# Patient Record
Sex: FEMALE | Race: WHITE | NOT HISPANIC OR LATINO | URBAN - METROPOLITAN AREA
[De-identification: names, ages, dates, MRNs, and addresses within clinical notes are randomized per-mention and may not be internally consistent; named-entity substitution may affect disease eponyms.]

---

## 2023-02-21 LAB
ANION GAP IN SER/PLAS: 11 MMOL/L (ref 10–20)
CALCIUM (MG/DL) IN SER/PLAS: 9.8 MG/DL (ref 8.6–10.6)
CARBON DIOXIDE, TOTAL (MMOL/L) IN SER/PLAS: 30 MMOL/L (ref 21–32)
CHLORIDE (MMOL/L) IN SER/PLAS: 104 MMOL/L (ref 98–107)
CREATININE (MG/DL) IN SER/PLAS: 0.84 MG/DL (ref 0.5–1.05)
GFR FEMALE: 78 ML/MIN/1.73M2
GLUCOSE (MG/DL) IN SER/PLAS: 79 MG/DL (ref 74–99)
POTASSIUM (MMOL/L) IN SER/PLAS: 4.3 MMOL/L (ref 3.5–5.3)
SODIUM (MMOL/L) IN SER/PLAS: 141 MMOL/L (ref 136–145)
UREA NITROGEN (MG/DL) IN SER/PLAS: 12 MG/DL (ref 6–23)

## 2023-07-31 LAB
ALANINE AMINOTRANSFERASE (SGPT) (U/L) IN SER/PLAS: 16 U/L (ref 7–45)
ALBUMIN (G/DL) IN SER/PLAS: 4.7 G/DL (ref 3.4–5)
ALKALINE PHOSPHATASE (U/L) IN SER/PLAS: 62 U/L (ref 33–136)
ANION GAP IN SER/PLAS: 12 MMOL/L (ref 10–20)
ASPARTATE AMINOTRANSFERASE (SGOT) (U/L) IN SER/PLAS: 20 U/L (ref 9–39)
BILIRUBIN TOTAL (MG/DL) IN SER/PLAS: 0.6 MG/DL (ref 0–1.2)
CALCIUM (MG/DL) IN SER/PLAS: 10.5 MG/DL (ref 8.6–10.6)
CARBON DIOXIDE, TOTAL (MMOL/L) IN SER/PLAS: 31 MMOL/L (ref 21–32)
CHLORIDE (MMOL/L) IN SER/PLAS: 100 MMOL/L (ref 98–107)
CHOLESTEROL (MG/DL) IN SER/PLAS: 161 MG/DL (ref 0–199)
CHOLESTEROL IN HDL (MG/DL) IN SER/PLAS: 87.5 MG/DL
CHOLESTEROL/HDL RATIO: 1.8
CREATININE (MG/DL) IN SER/PLAS: 0.89 MG/DL (ref 0.5–1.05)
ERYTHROCYTE DISTRIBUTION WIDTH (RATIO) BY AUTOMATED COUNT: 14.1 % (ref 11.5–14.5)
ERYTHROCYTE MEAN CORPUSCULAR HEMOGLOBIN CONCENTRATION (G/DL) BY AUTOMATED: 32 G/DL (ref 32–36)
ERYTHROCYTE MEAN CORPUSCULAR VOLUME (FL) BY AUTOMATED COUNT: 95 FL (ref 80–100)
ERYTHROCYTES (10*6/UL) IN BLOOD BY AUTOMATED COUNT: 4.62 X10E12/L (ref 4–5.2)
GFR FEMALE: 73 ML/MIN/1.73M2
GLUCOSE (MG/DL) IN SER/PLAS: 84 MG/DL (ref 74–99)
HEMATOCRIT (%) IN BLOOD BY AUTOMATED COUNT: 43.7 % (ref 36–46)
HEMOGLOBIN (G/DL) IN BLOOD: 14 G/DL (ref 12–16)
LDL: 64 MG/DL (ref 0–99)
LEUKOCYTES (10*3/UL) IN BLOOD BY AUTOMATED COUNT: 4.8 X10E9/L (ref 4.4–11.3)
NRBC (PER 100 WBCS) BY AUTOMATED COUNT: 0 /100 WBC (ref 0–0)
PLATELETS (10*3/UL) IN BLOOD AUTOMATED COUNT: 362 X10E9/L (ref 150–450)
POTASSIUM (MMOL/L) IN SER/PLAS: 4.3 MMOL/L (ref 3.5–5.3)
PROTEIN TOTAL: 7 G/DL (ref 6.4–8.2)
SODIUM (MMOL/L) IN SER/PLAS: 139 MMOL/L (ref 136–145)
TRIGLYCERIDE (MG/DL) IN SER/PLAS: 50 MG/DL (ref 0–149)
UREA NITROGEN (MG/DL) IN SER/PLAS: 13 MG/DL (ref 6–23)
VLDL: 10 MG/DL (ref 0–40)

## 2023-10-02 DIAGNOSIS — I34.0 MITRAL VALVE INSUFFICIENCY, UNSPECIFIED ETIOLOGY: Primary | ICD-10-CM

## 2023-10-10 ENCOUNTER — HOSPITAL ENCOUNTER (OUTPATIENT)
Dept: CARDIOLOGY | Facility: CLINIC | Age: 63
Discharge: HOME | End: 2023-10-10
Payer: COMMERCIAL

## 2023-10-10 VITALS
HEIGHT: 68 IN | BODY MASS INDEX: 25.46 KG/M2 | DIASTOLIC BLOOD PRESSURE: 82 MMHG | SYSTOLIC BLOOD PRESSURE: 130 MMHG | WEIGHT: 168 LBS

## 2023-10-10 DIAGNOSIS — I34.0 MITRAL VALVE INSUFFICIENCY, UNSPECIFIED ETIOLOGY: ICD-10-CM

## 2023-10-10 LAB — EJECTION FRACTION APICAL 4 CHAMBER: 61.8

## 2023-10-10 PROCEDURE — 93306 TTE W/DOPPLER COMPLETE: CPT

## 2023-10-10 PROCEDURE — 93306 TTE W/DOPPLER COMPLETE: CPT | Performed by: INTERNAL MEDICINE

## 2023-10-11 ENCOUNTER — TELEPHONE (OUTPATIENT)
Dept: PRIMARY CARE | Facility: CLINIC | Age: 63
End: 2023-10-11
Payer: COMMERCIAL

## 2023-10-11 NOTE — TELEPHONE ENCOUNTER
"\"There is also another message regarding echo.   Spoke with Dr Santos. He recommended if patient not having any symptoms such as shortness of breath to just follow clinically. Only do more testing if develops symptoms. Please let patient know.\"    Called pt and left detailed message.  Encouraged to contact clinical with questions.  "

## 2023-10-11 NOTE — TELEPHONE ENCOUNTER
"\"ECHO shows normal pumping function of the heart. It does show mitral regurgitation but the severity could not be seen on these images. Will check with cardiologist as to whether anything else should be done at this time to check on the valve\"    Called pt and informed, voiced understanding.  "

## 2023-10-27 ENCOUNTER — TELEPHONE (OUTPATIENT)
Dept: PRIMARY CARE | Facility: CLINIC | Age: 63
End: 2023-10-27
Payer: COMMERCIAL

## 2023-10-27 NOTE — TELEPHONE ENCOUNTER
----- Message from Eli Cortez MD sent at 10/26/2023  5:21 PM EDT -----  Mammogram is negative (normal). Radiologist indicates breasts are dense which can make it more difficult to detect abnormalities.  Recommend repeat mammogram in 1 year.

## 2024-02-09 ENCOUNTER — APPOINTMENT (OUTPATIENT)
Dept: PRIMARY CARE | Facility: CLINIC | Age: 64
End: 2024-02-09

## 2024-02-13 ENCOUNTER — TELEMEDICINE (OUTPATIENT)
Dept: PRIMARY CARE | Facility: CLINIC | Age: 64
End: 2024-02-13
Payer: COMMERCIAL

## 2024-02-13 DIAGNOSIS — U07.1 COVID: Primary | ICD-10-CM

## 2024-02-13 DIAGNOSIS — E27.8 ADRENAL INCIDENTALOMA (MULTI): ICD-10-CM

## 2024-02-13 PROBLEM — K57.90 DIVERTICULOSIS: Status: ACTIVE | Noted: 2024-02-13

## 2024-02-13 PROBLEM — E78.5 HYPERLIPIDEMIA: Status: ACTIVE | Noted: 2024-02-13

## 2024-02-13 PROBLEM — R00.2 PALPITATIONS: Status: ACTIVE | Noted: 2024-02-13

## 2024-02-13 PROBLEM — E83.52 HYPERCALCEMIA: Status: ACTIVE | Noted: 2024-02-13

## 2024-02-13 PROBLEM — N60.19 DIFFUSE CYSTIC MASTOPATHY: Status: ACTIVE | Noted: 2024-02-13

## 2024-02-13 PROBLEM — M54.16 LUMBAR RADICULOPATHY, CHRONIC: Status: ACTIVE | Noted: 2024-02-13

## 2024-02-13 PROCEDURE — 99441 PR PHYS/QHP TELEPHONE EVALUATION 5-10 MIN: CPT | Performed by: INTERNAL MEDICINE

## 2024-02-13 PROCEDURE — 1036F TOBACCO NON-USER: CPT | Performed by: INTERNAL MEDICINE

## 2024-02-13 RX ORDER — GLUCOSAMINE/D3/BOSWELLIA SERRA 1500MG-400
1 TABLET ORAL EVERY OTHER DAY
COMMUNITY

## 2024-02-13 RX ORDER — ACETAMINOPHEN 500 MG
1000 TABLET ORAL
COMMUNITY

## 2024-02-13 RX ORDER — METHYLPREDNISOLONE 4 MG/1
TABLET ORAL
Qty: 21 TABLET | Refills: 0 | Status: SHIPPED | OUTPATIENT
Start: 2024-02-13 | End: 2024-02-27 | Stop reason: ALTCHOICE

## 2024-02-13 RX ORDER — ROSUVASTATIN CALCIUM 20 MG/1
20 TABLET, COATED ORAL DAILY
COMMUNITY
End: 2024-05-22 | Stop reason: SDUPTHER

## 2024-02-13 RX ORDER — HYDROCHLOROTHIAZIDE 25 MG/1
25 TABLET ORAL DAILY
COMMUNITY
Start: 2024-02-02 | End: 2024-05-22 | Stop reason: SDUPTHER

## 2024-02-13 RX ORDER — METOPROLOL SUCCINATE 25 MG/1
25 TABLET, EXTENDED RELEASE ORAL DAILY
COMMUNITY
Start: 2024-02-02 | End: 2024-05-22 | Stop reason: SDUPTHER

## 2024-02-13 RX ORDER — LORATADINE 10 MG/1
1 TABLET ORAL DAILY
COMMUNITY

## 2024-02-13 ASSESSMENT — PATIENT HEALTH QUESTIONNAIRE - PHQ9
1. LITTLE INTEREST OR PLEASURE IN DOING THINGS: NOT AT ALL
SUM OF ALL RESPONSES TO PHQ9 QUESTIONS 1 & 2: 0
2. FEELING DOWN, DEPRESSED OR HOPELESS: NOT AT ALL

## 2024-02-13 NOTE — PROGRESS NOTES
"Standard Progress Note  Chief Complaint   Patient presents with    covid     Pt presents for telephone visit for positive covid.  Pt took two home covid tests this AM and both were positive.  Pt c/o sore throat, tight cough and runny nose.  Onset of symptoms 2/12/24.  Pt provided T 98.5, /91, HR 80.  Pt states \"I did take some cold medicine last night and I am sure that is what made my blood pressure high.\"     [phone]  Virtual or Telephone Consent    A telephone visit (audio only) between the patient (at the originating site) and the provider (at the distant site) was utilized to provide this telehealth service.   Verbal consent was requested and obtained from Kimberli Turner on this date, 02/13/24 for a telehealth visit.   Onset 2 days ago-started to feel like something was coming.   Yesterday could feel like a cold. Last night felt it more.  Headache.   Dry cough. No wheezing or sob. No diarrhea.   Some body aches yesterday and last night. Took otc cold medication which helped but raised bp.   Also took aleve.     HPI:  Kimberli Turner 63 y.o.   female    Patient Active Problem List   Diagnosis    Adrenal incidentaloma (CMS/HCC)    Diffuse cystic mastopathy    Diverticulitis of colon (without mention of hemorrhage)(562.11)    Diverticulosis    Endometriosis    Hypercalcemia    Hyperlipidemia    Iron deficiency anemia, unspecified    Lumbar radiculopathy, chronic    Palpitations       There were no vitals taken for this visit.  There is no height or weight on file to calculate BMI.  Sounds congested able to speak in full sentences.         Lab Results   Component Value Date    GLUCOSE 84 07/31/2023    CALCIUM 10.5 07/31/2023     07/31/2023    K 4.3 07/31/2023    CO2 31 07/31/2023     07/31/2023    BUN 13 07/31/2023    CREATININE 0.89 07/31/2023      Lab Results   Component Value Date    WBC 4.8 07/31/2023    HGB 14.0 07/31/2023    HCT 43.7 07/31/2023    MCV 95 07/31/2023    " " 07/31/2023      Lab Results   Component Value Date    CHOL 161 07/31/2023     Lab Results   Component Value Date    HDL 87.5 07/31/2023     No results found for: \"LDLCALC\"  Lab Results   Component Value Date    TRIG 50 07/31/2023     No components found for: \"CHOLHDL\"    1. COVID  - methylPREDNISolone (Medrol Dospak) 4 mg tablets; Take as directed on package.  Dispense: 21 tablet; Refill: 0  Discussed option of paxlovid-she defers for now.   ER if hemoptysis or severe SOB  If progresses can be seen in office  Advised to watch bp.   Avoid nsaids. Tylenol ok.     2. Adrenal incidentaloma (CMS/HCC)  Prior diagnosis.     Time prep 2 minutes  Patient 5 minutes  Charting 3 minutes    Current Outpatient Medications on File Prior to Visit   Medication Sig Dispense Refill    acetaminophen (Tylenol) 500 mg tablet Take 2 tablets (1,000 mg) by mouth. EVERY 4 TO 6 HOURS AS NEEDED.      BACILLUS COAGULANS-INULIN ORAL Take 1 capsule by mouth once daily.      cartilage/collagen/bor/hyalur (JOINT HEALTH ORAL) Take 1 capsule by mouth every other day.      ferrous sulfate ER (Slow Iron) 140 (45 Fe) MG ER tablet Take 1 tablet (45 mg) by mouth every other day.      glucosamine-D3-Boswellia serr 1,500-400-100 mg-unit-mg tablet Take 1 tablet by mouth every other day.      hydroCHLOROthiazide (HYDRODiuril) 25 mg tablet Take 1 tablet (25 mg) by mouth once daily.      loratadine (Claritin) 10 mg tablet Take 1 tablet (10 mg) by mouth once daily.      metoprolol succinate XL (Toprol-XL) 25 mg 24 hr tablet Take 1 tablet (25 mg) by mouth once daily.      rosuvastatin (Crestor) 20 mg tablet Take 1 tablet (20 mg) by mouth once daily.       No current facility-administered medications on file prior to visit.         Eli Sandra MD  "

## 2024-02-27 ENCOUNTER — OFFICE VISIT (OUTPATIENT)
Dept: PRIMARY CARE | Facility: CLINIC | Age: 64
End: 2024-02-27
Payer: COMMERCIAL

## 2024-02-27 VITALS
HEART RATE: 58 BPM | DIASTOLIC BLOOD PRESSURE: 80 MMHG | TEMPERATURE: 97.6 F | OXYGEN SATURATION: 98 % | HEIGHT: 68 IN | WEIGHT: 170.8 LBS | BODY MASS INDEX: 25.88 KG/M2 | SYSTOLIC BLOOD PRESSURE: 148 MMHG

## 2024-02-27 DIAGNOSIS — R05.9 COUGH, UNSPECIFIED TYPE: Primary | ICD-10-CM

## 2024-02-27 DIAGNOSIS — I10 HYPERTENSION, UNSPECIFIED TYPE: ICD-10-CM

## 2024-02-27 PROCEDURE — 3074F SYST BP LT 130 MM HG: CPT | Performed by: INTERNAL MEDICINE

## 2024-02-27 PROCEDURE — 99214 OFFICE O/P EST MOD 30 MIN: CPT | Performed by: INTERNAL MEDICINE

## 2024-02-27 PROCEDURE — 1036F TOBACCO NON-USER: CPT | Performed by: INTERNAL MEDICINE

## 2024-02-27 PROCEDURE — 3078F DIAST BP <80 MM HG: CPT | Performed by: INTERNAL MEDICINE

## 2024-02-27 RX ORDER — LOSARTAN POTASSIUM 25 MG/1
25 TABLET ORAL DAILY
Qty: 30 TABLET | Refills: 11 | Status: SHIPPED | OUTPATIENT
Start: 2024-02-27 | End: 2024-05-22 | Stop reason: SDUPTHER

## 2024-02-27 RX ORDER — MONTELUKAST SODIUM 10 MG/1
TABLET ORAL
Qty: 30 TABLET | Refills: 0 | Status: SHIPPED | OUTPATIENT
Start: 2024-02-27 | End: 2024-05-22 | Stop reason: WASHOUT

## 2024-02-27 ASSESSMENT — PAIN SCALES - GENERAL: PAINLEVEL: 0-NO PAIN

## 2024-02-27 NOTE — PROGRESS NOTES
"Chief Complaint   Patient presents with    Follow-up     Pt here for 6 mo FUV.       63 year old woman  Covid-phone visit 2/13/2024. Has cough. Has some phlegm back of her throat.  Using robitussin dm and cough drops.  No wheezing except chronic at night.     Home readings up to 170.    PAST MEDICAL HISTORY:  1. Hysterectomy for benign reasons.  2. History of ablation for abnormal heart rhythm approximately at age 40.  3. Former smoker.  4. Diverticulitis, recurrent  colon polyps. Colonoscopy, December 2015.  5. Chronic back pain.  6. Mitral valve prolapse, mild-mod. MV anterior leaflet prolapse. Echo 2022  7. cardiac ablation [facility] 1990s. zio patch 10/2022   8. Left leg, ankle pain. Saw podiatrist. MRI September 2022 lumbar spine. Moderate arthritis     PAST SURGICAL HISTORY:  1. Hysterectomy at age 40.  2. Lumpectomy left breast, benign and bilateral breast augmentation.     SOCIAL HISTORY: Former smoker, quit as a teenager, She works as a   for [employer]. She has no children. She is .    Blood pressure 128/78, pulse 58, temperature 36.4 °C (97.6 °F), height 1.727 m (5' 8\"), weight 77.5 kg (170 lb 12.8 oz), SpO2 98 %.  Body mass index is 25.97 kg/m².      Labs 7/2023 cbc, cmp, lipid  Lab Results   Component Value Date    WBC 4.8 07/31/2023    HGB 14.0 07/31/2023    HCT 43.7 07/31/2023    MCV 95 07/31/2023     07/31/2023     Lab Results   Component Value Date    GLUCOSE 84 07/31/2023    CALCIUM 10.5 07/31/2023     07/31/2023    K 4.3 07/31/2023    CO2 31 07/31/2023     07/31/2023    BUN 13 07/31/2023    CREATININE 0.89 07/31/2023     Lab Results   Component Value Date    ALT 16 07/31/2023    AST 20 07/31/2023    ALKPHOS 62 07/31/2023    BILITOT 0.6 07/31/2023     No results found for: \"LIPASE\"    1. Cough, unspecified type    - montelukast (Singulair) 10 mg tablet; One po every day for 30 days then stop  Dispense: 30 tablet; Refill: 0    2. Hypertension, unspecified type  Add losartan. " Labs 1 week after starting. F/up 2-3 months to check bp  - losartan (Cozaar) 25 mg tablet; Take 1 tablet (25 mg) by mouth once daily.  Dispense: 30 tablet; Refill: 11      Mammogram 10/2023  Hysterectomy  Colonoscopy     Current Outpatient Medications on File Prior to Visit   Medication Sig Dispense Refill    acetaminophen (Tylenol) 500 mg tablet Take 2 tablets (1,000 mg) by mouth. EVERY 4 TO 6 HOURS AS NEEDED.      BACILLUS COAGULANS-INULIN ORAL Take 1 capsule by mouth once daily.      cartilage/collagen/bor/hyalur (JOINT HEALTH ORAL) Take 1 capsule by mouth every other day.      ferrous sulfate ER (Slow Iron) 140 (45 Fe) MG ER tablet Take 1 tablet (45 mg) by mouth every other day.      glucosamine-D3-Boswellia serr 1,500-400-100 mg-unit-mg tablet Take 1 tablet by mouth every other day.      hydroCHLOROthiazide (HYDRODiuril) 25 mg tablet Take 1 tablet (25 mg) by mouth once daily.      loratadine (Claritin) 10 mg tablet Take 1 tablet (10 mg) by mouth once daily.      metoprolol succinate XL (Toprol-XL) 25 mg 24 hr tablet Take 1 tablet (25 mg) by mouth once daily.      rosuvastatin (Crestor) 20 mg tablet Take 1 tablet (20 mg) by mouth once daily.      [] methylPREDNISolone (Medrol Dospak) 4 mg tablets Take as directed on package. 21 tablet 0     No current facility-administered medications on file prior to visit.

## 2024-04-11 DIAGNOSIS — D72.819 LEUKOPENIA, UNSPECIFIED TYPE: Primary | ICD-10-CM

## 2024-04-11 LAB
NON-UH HIE A/G RATIO: 1.3
NON-UH HIE ALB: 3.9 G/DL (ref 3.4–5)
NON-UH HIE ALK PHOS: 67 UNIT/L (ref 45–117)
NON-UH HIE BILIRUBIN, TOTAL: 0.6 MG/DL (ref 0.3–1.2)
NON-UH HIE BUN/CREAT RATIO: 18.8
NON-UH HIE BUN: 15 MG/DL (ref 9–23)
NON-UH HIE CALCIUM: 10.1 MG/DL (ref 8.7–10.4)
NON-UH HIE CALCULATED LDL CHOLESTEROL: 78 MG/DL (ref 60–130)
NON-UH HIE CALCULATED OSMOLALITY: 281 MOSM/KG (ref 275–295)
NON-UH HIE CHLORIDE: 104 MMOL/L (ref 98–107)
NON-UH HIE CHOLESTEROL: 173 MG/DL (ref 100–200)
NON-UH HIE CO2, VENOUS: 30 MMOL/L (ref 20–31)
NON-UH HIE CREATININE: 0.8 MG/DL (ref 0.5–0.8)
NON-UH HIE GFR AA: >60
NON-UH HIE GLOBULIN: 2.9 G/DL
NON-UH HIE GLOMERULAR FILTRATION RATE: >60 ML/MIN/1.73M?
NON-UH HIE GLUCOSE: 82 MG/DL (ref 74–106)
NON-UH HIE GOT: 24 UNIT/L (ref 15–37)
NON-UH HIE GPT: 22 UNIT/L (ref 10–49)
NON-UH HIE HCT: 41.1 % (ref 36–46)
NON-UH HIE HDL CHOLESTEROL: 87 MG/DL (ref 40–60)
NON-UH HIE HGB: 14 G/DL (ref 12–16)
NON-UH HIE INSTR WBC ND: 4
NON-UH HIE K: 4.2 MMOL/L (ref 3.5–5.1)
NON-UH HIE MCH: 30.6 PG (ref 27–34)
NON-UH HIE MCHC: 34.1 G/DL (ref 32–37)
NON-UH HIE MCV: 89.7 FL (ref 80–100)
NON-UH HIE MPV: 8.7 FL (ref 7.4–10.4)
NON-UH HIE NA: 141 MMOL/L (ref 135–145)
NON-UH HIE PLATELET: 280 X10 (ref 150–450)
NON-UH HIE RBC: 4.58 X10 (ref 4.2–5.4)
NON-UH HIE RDW: 15.3 % (ref 11.5–14.5)
NON-UH HIE TOTAL CHOL/HDL CHOL RATIO: 2
NON-UH HIE TOTAL PROTEIN: 6.8 G/DL (ref 5.7–8.2)
NON-UH HIE TRIGLYCERIDES: 42 MG/DL (ref 30–150)
NON-UH HIE WBC: 4 X10 (ref 4.5–11)

## 2024-05-21 PROBLEM — H93.19 TINNITUS: Status: ACTIVE | Noted: 2024-05-21

## 2024-05-21 PROBLEM — H69.90 DYSFUNCTION OF EUSTACHIAN TUBE: Status: ACTIVE | Noted: 2024-05-21

## 2024-05-21 PROBLEM — R03.0 ELEVATED BP WITHOUT DIAGNOSIS OF HYPERTENSION: Status: ACTIVE | Noted: 2024-05-21

## 2024-05-21 PROBLEM — R05.9 COUGH: Status: ACTIVE | Noted: 2024-05-21

## 2024-05-21 PROBLEM — R25.2 LEG CRAMPS: Status: ACTIVE | Noted: 2024-05-21

## 2024-05-21 PROBLEM — M79.673 PAIN OF FOOT: Status: ACTIVE | Noted: 2024-05-21

## 2024-05-21 PROBLEM — I34.0 MITRAL VALVE INSUFFICIENCY: Status: ACTIVE | Noted: 2024-05-21

## 2024-05-21 PROBLEM — U07.1 DISEASE DUE TO SEVERE ACUTE RESPIRATORY SYNDROME CORONAVIRUS 2 (SARS-COV-2): Status: ACTIVE | Noted: 2024-05-21

## 2024-05-21 PROBLEM — J02.9 SORE THROAT: Status: ACTIVE | Noted: 2024-05-21

## 2024-05-21 PROBLEM — H90.3 ASYMMETRICAL SENSORINEURAL HEARING LOSS: Status: ACTIVE | Noted: 2023-01-11

## 2024-05-21 PROBLEM — M47.816 SPONDYLOSIS OF LUMBAR SPINE: Status: ACTIVE | Noted: 2024-05-21

## 2024-05-21 PROBLEM — K57.90 DIVERTICULAR DISEASE: Status: ACTIVE | Noted: 2019-01-02

## 2024-05-21 PROBLEM — I10 HYPERTENSION: Status: ACTIVE | Noted: 2024-05-21

## 2024-05-21 PROBLEM — I51.7 CARDIOMEGALY: Status: ACTIVE | Noted: 2019-01-28

## 2024-05-21 NOTE — PROGRESS NOTES
"Chief Complaint   Patient presents with    Follow-up     Pt here for 3 mo FUV     63 year old woman  Last evaluation 2/2024. F/up losartan.   CTS right arm-using brace. Flared with pulling weeds. Feels it will improve with using brace.  Sometimes groin pain at night if walks a lot or her position. Takes tylenol  Does have leg cramps.     PAST MEDICAL HISTORY:  1. Hysterectomy for benign reasons.  2. History of ablation for abnormal heart rhythm approximately at age 40.  3. Former smoker.  4. Diverticulitis, recurrent  colon polyps. Colonoscopy, December 2015. Resection part of bowel. Dr Alo KING  5. Chronic back pain.  6. Mitral valve prolapse, mild-mod. MV anterior leaflet prolapse. Echo 2022  7. cardiac ablation [facility] 1990s. zio patch 10/2022   8. Left leg, ankle pain. Saw podiatrist. MRI September 2022 lumbar spine. Moderate arthritis     PAST SURGICAL HISTORY:  1. Hysterectomy at age 40.  2. Lumpectomy left breast, benign and bilateral breast augmentation.     SOCIAL HISTORY: Former smoker, quit as a teenager, She works as a   for custom. She has no children. She is .    Blood pressure 122/78, pulse 59, temperature 36.4 °C (97.6 °F), height 1.727 m (5' 8\"), weight 78.6 kg (173 lb 3.2 oz), SpO2 98%.  Body mass index is 26.33 kg/m².  Glasses  Right wrist brace    Vital reviewed  Neck: no cervical/clavicular adenopathy  CV: RRR S1 S2 normal. No murmur. No carotid bruit.   Lungs: CTA without wrr. Breath sounds symmetric      Extremities: no pretibial edema  Neuro: speech intact.     Labs 4/2024 lipid, cmp, cbc  Cholesterol 173  K 4.2 Cr 0.8   Wbc low 4. Hg 14  glucose 82    Labs 7/2023 cbc, cmp, lipid  Lab Results   Component Value Date    WBC 4.8 07/31/2023    HGB 14.0 07/31/2023    HCT 43.7 07/31/2023    MCV 95 07/31/2023     07/31/2023     Lab Results   Component Value Date    GLUCOSE 84 07/31/2023    CALCIUM 10.5 07/31/2023     07/31/2023    K 4.3 07/31/2023    CO2 31 07/31/2023    "  07/31/2023    BUN 13 07/31/2023    CREATININE 0.89 07/31/2023     Lab Results   Component Value Date    ALT 16 07/31/2023    AST 20 07/31/2023    ALKPHOS 62 07/31/2023    BILITOT 0.6 07/31/2023       1. Hypertension, unspecified type  Well controlled. Continue on current medications  - losartan (Cozaar) 25 mg tablet; Take 1 tablet (25 mg) by mouth once daily.  Dispense: 90 tablet; Refill: 3  - hydroCHLOROthiazide (HYDRODiuril) 25 mg tablet; Take 1 tablet (25 mg) by mouth once daily.  Dispense: 90 tablet; Refill: 3  - metoprolol succinate XL (Toprol-XL) 25 mg 24 hr tablet; Take 1 tablet (25 mg) by mouth once daily.  Dispense: 90 tablet; Refill: 3    2. Encounter for screening mammogram for breast cancer    - BI mammo bilateral screening tomosynthesis; Future    3. Hyperlipidemia, unspecified hyperlipidemia type    - rosuvastatin (Crestor) 20 mg tablet; Take 1 tablet (20 mg) by mouth once daily.  Dispense: 90 tablet; Refill: 3    4. Leg cramps  Check vitamin D in 6 months  Advised try otc magnesium      Low wbc-has order for cbc.      Mammogram 10/2023- future order  Hysterectomy  Colonoscopy 2015 Dr Russell Stovall (path fragments of tubular adenoma)     Current Outpatient Medications on File Prior to Visit   Medication Sig Dispense Refill    acetaminophen (Tylenol) 500 mg tablet Take 2 tablets (1,000 mg) by mouth. EVERY 4 TO 6 HOURS AS NEEDED.      BACILLUS COAGULANS-INULIN ORAL Take 1 capsule by mouth once daily.      cartilage/collagen/bor/hyalur (JOINT HEALTH ORAL) Take 1 capsule by mouth every other day.      ferrous sulfate ER (Slow Iron) 140 (45 Fe) MG ER tablet Take 1 tablet (45 mg) by mouth every other day.      glucosamine-D3-Boswellia serr 1,500-400-100 mg-unit-mg tablet Take 1 tablet by mouth every other day.      hydroCHLOROthiazide (HYDRODiuril) 25 mg tablet Take 1 tablet (25 mg) by mouth once daily.      loratadine (Claritin) 10 mg tablet Take 1 tablet (10 mg) by mouth once daily.      losartan  (Cozaar) 25 mg tablet Take 1 tablet (25 mg) by mouth once daily. 30 tablet 11    metoprolol succinate XL (Toprol-XL) 25 mg 24 hr tablet Take 1 tablet (25 mg) by mouth once daily.      rosuvastatin (Crestor) 20 mg tablet Take 1 tablet (20 mg) by mouth once daily.      [DISCONTINUED] montelukast (Singulair) 10 mg tablet One po every day for 30 days then stop (Patient not taking: Reported on 5/22/2024) 30 tablet 0     No current facility-administered medications on file prior to visit.

## 2024-05-22 ENCOUNTER — OFFICE VISIT (OUTPATIENT)
Dept: PRIMARY CARE | Facility: CLINIC | Age: 64
End: 2024-05-22
Payer: COMMERCIAL

## 2024-05-22 VITALS
DIASTOLIC BLOOD PRESSURE: 78 MMHG | BODY MASS INDEX: 26.25 KG/M2 | SYSTOLIC BLOOD PRESSURE: 122 MMHG | WEIGHT: 173.2 LBS | TEMPERATURE: 97.6 F | OXYGEN SATURATION: 98 % | HEIGHT: 68 IN | HEART RATE: 59 BPM

## 2024-05-22 DIAGNOSIS — I10 HYPERTENSION, UNSPECIFIED TYPE: ICD-10-CM

## 2024-05-22 DIAGNOSIS — Z12.31 ENCOUNTER FOR SCREENING MAMMOGRAM FOR BREAST CANCER: Primary | ICD-10-CM

## 2024-05-22 DIAGNOSIS — R25.2 LEG CRAMPS: ICD-10-CM

## 2024-05-22 DIAGNOSIS — E78.5 HYPERLIPIDEMIA, UNSPECIFIED HYPERLIPIDEMIA TYPE: ICD-10-CM

## 2024-05-22 PROBLEM — E83.52 HYPERCALCEMIA: Status: RESOLVED | Noted: 2024-02-13 | Resolved: 2024-05-22

## 2024-05-22 PROBLEM — R03.0 ELEVATED BP WITHOUT DIAGNOSIS OF HYPERTENSION: Status: RESOLVED | Noted: 2024-05-21 | Resolved: 2024-05-22

## 2024-05-22 PROBLEM — U07.1 DISEASE DUE TO SEVERE ACUTE RESPIRATORY SYNDROME CORONAVIRUS 2 (SARS-COV-2): Status: RESOLVED | Noted: 2024-05-21 | Resolved: 2024-05-22

## 2024-05-22 PROBLEM — H69.90 DYSFUNCTION OF EUSTACHIAN TUBE: Status: RESOLVED | Noted: 2024-05-21 | Resolved: 2024-05-22

## 2024-05-22 PROBLEM — I51.7 CARDIOMEGALY: Status: RESOLVED | Noted: 2019-01-28 | Resolved: 2024-05-22

## 2024-05-22 PROBLEM — R05.9 COUGH: Status: RESOLVED | Noted: 2024-05-21 | Resolved: 2024-05-22

## 2024-05-22 PROBLEM — J02.9 SORE THROAT: Status: RESOLVED | Noted: 2024-05-21 | Resolved: 2024-05-22

## 2024-05-22 PROCEDURE — 99214 OFFICE O/P EST MOD 30 MIN: CPT | Performed by: INTERNAL MEDICINE

## 2024-05-22 PROCEDURE — 3074F SYST BP LT 130 MM HG: CPT | Performed by: INTERNAL MEDICINE

## 2024-05-22 PROCEDURE — 1036F TOBACCO NON-USER: CPT | Performed by: INTERNAL MEDICINE

## 2024-05-22 PROCEDURE — 3078F DIAST BP <80 MM HG: CPT | Performed by: INTERNAL MEDICINE

## 2024-05-22 RX ORDER — LOSARTAN POTASSIUM 25 MG/1
25 TABLET ORAL DAILY
Qty: 90 TABLET | Refills: 3 | Status: SHIPPED | OUTPATIENT
Start: 2024-05-22 | End: 2025-05-22

## 2024-05-22 RX ORDER — HYDROCHLOROTHIAZIDE 25 MG/1
25 TABLET ORAL DAILY
Qty: 90 TABLET | Refills: 3 | Status: SHIPPED | OUTPATIENT
Start: 2024-05-22

## 2024-05-22 RX ORDER — METOPROLOL SUCCINATE 25 MG/1
25 TABLET, EXTENDED RELEASE ORAL DAILY
Qty: 90 TABLET | Refills: 3 | Status: SHIPPED | OUTPATIENT
Start: 2024-05-22

## 2024-05-22 RX ORDER — ROSUVASTATIN CALCIUM 20 MG/1
20 TABLET, COATED ORAL DAILY
Qty: 90 TABLET | Refills: 3 | Status: SHIPPED | OUTPATIENT
Start: 2024-05-22

## 2024-05-22 ASSESSMENT — PATIENT HEALTH QUESTIONNAIRE - PHQ9
SUM OF ALL RESPONSES TO PHQ9 QUESTIONS 1 & 2: 0
1. LITTLE INTEREST OR PLEASURE IN DOING THINGS: NOT AT ALL
2. FEELING DOWN, DEPRESSED OR HOPELESS: NOT AT ALL

## 2024-05-22 ASSESSMENT — PAIN SCALES - GENERAL: PAINLEVEL: 0-NO PAIN

## 2024-07-11 ENCOUNTER — TELEPHONE (OUTPATIENT)
Dept: PRIMARY CARE | Facility: CLINIC | Age: 64
End: 2024-07-11
Payer: COMMERCIAL

## 2024-07-11 NOTE — TELEPHONE ENCOUNTER
"Pt left message \"I am having issues with my foot to the point that I can barely walk.  I have seen two podiatrists and a sports physician.  The sports physician thinks that my issue has to do with nerves and recommends that I see a foot surgeon.  I was wondering if there is anyone that Dr. Sandra recommends?\"  Last OV 5/22/24  Pend OV 11/22/24  "

## 2024-09-18 ENCOUNTER — TELEPHONE (OUTPATIENT)
Dept: PRIMARY CARE | Facility: CLINIC | Age: 64
End: 2024-09-18
Payer: COMMERCIAL

## 2024-09-18 NOTE — TELEPHONE ENCOUNTER
"Pt left message \"I had an appointment with Dr. Hollingsworth at the recommendation of Dr. Sandra.  He seems to think that the issue I am having is related to my spine and not my feet.  He would like for me to see Dr. Overton.  I just want to know if Dr. Sandra agrees with this recommendation, or is there someone else she recommends?\"  Last OV 5/22/24  Pend OV 11/22/24  "

## 2024-10-17 ENCOUNTER — TELEPHONE (OUTPATIENT)
Dept: PRIMARY CARE | Facility: CLINIC | Age: 64
End: 2024-10-17
Payer: COMMERCIAL

## 2024-10-17 NOTE — TELEPHONE ENCOUNTER
"Pt left message \"I have been having trouble with my bowel movements for the past three weeks.  I am having to take laxatives to get things moving and when I do go the bm is very thin and short.  The box says that if symptoms last more than two weeks to contact my physician, so that is what I am doing.  I think my colon may be inflamed.  I am just wondering if I should be seen, or should I wait a little longer?  I am not in any discomfort, only when things build up and then there is pressure.\"  Last OV 5/22/24  Pend OV 11/22/24  "

## 2024-11-12 ENCOUNTER — LAB (OUTPATIENT)
Dept: LAB | Facility: LAB | Age: 64
End: 2024-11-12
Payer: COMMERCIAL

## 2024-11-12 DIAGNOSIS — D72.819 LEUKOPENIA, UNSPECIFIED TYPE: ICD-10-CM

## 2024-11-12 DIAGNOSIS — I10 HYPERTENSION, UNSPECIFIED TYPE: ICD-10-CM

## 2024-11-12 DIAGNOSIS — R25.2 LEG CRAMPS: ICD-10-CM

## 2024-11-12 LAB
25(OH)D3 SERPL-MCNC: 35 NG/ML (ref 30–100)
ALBUMIN SERPL BCP-MCNC: 4.3 G/DL (ref 3.4–5)
ALP SERPL-CCNC: 64 U/L (ref 33–136)
ALT SERPL W P-5'-P-CCNC: 17 U/L (ref 7–45)
ANION GAP SERPL CALC-SCNC: 12 MMOL/L (ref 10–20)
AST SERPL W P-5'-P-CCNC: 23 U/L (ref 9–39)
BASOPHILS # BLD AUTO: 0.03 X10*3/UL (ref 0–0.1)
BASOPHILS NFR BLD AUTO: 0.6 %
BILIRUB SERPL-MCNC: 0.6 MG/DL (ref 0–1.2)
BUN SERPL-MCNC: 14 MG/DL (ref 6–23)
CALCIUM SERPL-MCNC: 10 MG/DL (ref 8.6–10.6)
CHLORIDE SERPL-SCNC: 98 MMOL/L (ref 98–107)
CHOLEST SERPL-MCNC: 166 MG/DL (ref 0–199)
CHOLESTEROL/HDL RATIO: 2.1
CO2 SERPL-SCNC: 31 MMOL/L (ref 21–32)
CREAT SERPL-MCNC: 0.84 MG/DL (ref 0.5–1.05)
EGFRCR SERPLBLD CKD-EPI 2021: 78 ML/MIN/1.73M*2
EOSINOPHIL # BLD AUTO: 0.07 X10*3/UL (ref 0–0.7)
EOSINOPHIL NFR BLD AUTO: 1.4 %
ERYTHROCYTE [DISTWIDTH] IN BLOOD BY AUTOMATED COUNT: 13.7 % (ref 11.5–14.5)
GLUCOSE SERPL-MCNC: 81 MG/DL (ref 74–99)
HCT VFR BLD AUTO: 44.1 % (ref 36–46)
HDLC SERPL-MCNC: 80.5 MG/DL
HGB BLD-MCNC: 14.1 G/DL (ref 12–16)
IMM GRANULOCYTES # BLD AUTO: 0.01 X10*3/UL (ref 0–0.7)
IMM GRANULOCYTES NFR BLD AUTO: 0.2 % (ref 0–0.9)
LDLC SERPL CALC-MCNC: 73 MG/DL
LYMPHOCYTES # BLD AUTO: 0.99 X10*3/UL (ref 1.2–4.8)
LYMPHOCYTES NFR BLD AUTO: 19.5 %
MCH RBC QN AUTO: 29.4 PG (ref 26–34)
MCHC RBC AUTO-ENTMCNC: 32 G/DL (ref 32–36)
MCV RBC AUTO: 92 FL (ref 80–100)
MONOCYTES # BLD AUTO: 0.52 X10*3/UL (ref 0.1–1)
MONOCYTES NFR BLD AUTO: 10.3 %
NEUTROPHILS # BLD AUTO: 3.45 X10*3/UL (ref 1.2–7.7)
NEUTROPHILS NFR BLD AUTO: 68 %
NON HDL CHOLESTEROL: 86 MG/DL (ref 0–149)
NRBC BLD-RTO: 0 /100 WBCS (ref 0–0)
PLATELET # BLD AUTO: 336 X10*3/UL (ref 150–450)
POTASSIUM SERPL-SCNC: 4.2 MMOL/L (ref 3.5–5.3)
PROT SERPL-MCNC: 6.9 G/DL (ref 6.4–8.2)
RBC # BLD AUTO: 4.79 X10*6/UL (ref 4–5.2)
SODIUM SERPL-SCNC: 137 MMOL/L (ref 136–145)
TRIGL SERPL-MCNC: 65 MG/DL (ref 0–149)
VLDL: 13 MG/DL (ref 0–40)
WBC # BLD AUTO: 5.1 X10*3/UL (ref 4.4–11.3)

## 2024-11-22 ENCOUNTER — HOSPITAL ENCOUNTER (OUTPATIENT)
Dept: RADIOLOGY | Facility: CLINIC | Age: 64
Discharge: HOME | End: 2024-11-22
Payer: COMMERCIAL

## 2024-11-22 ENCOUNTER — APPOINTMENT (OUTPATIENT)
Dept: PRIMARY CARE | Facility: CLINIC | Age: 64
End: 2024-11-22
Payer: COMMERCIAL

## 2024-11-22 VITALS — BODY MASS INDEX: 26.22 KG/M2 | HEIGHT: 68 IN | WEIGHT: 173 LBS

## 2024-11-22 VITALS
OXYGEN SATURATION: 98 % | HEIGHT: 68 IN | SYSTOLIC BLOOD PRESSURE: 110 MMHG | HEART RATE: 50 BPM | BODY MASS INDEX: 26.31 KG/M2 | TEMPERATURE: 97.2 F | WEIGHT: 173.6 LBS | DIASTOLIC BLOOD PRESSURE: 70 MMHG

## 2024-11-22 DIAGNOSIS — Z12.31 ENCOUNTER FOR SCREENING MAMMOGRAM FOR BREAST CANCER: ICD-10-CM

## 2024-11-22 DIAGNOSIS — I10 HYPERTENSION, UNSPECIFIED TYPE: Primary | ICD-10-CM

## 2024-11-22 DIAGNOSIS — E78.5 HYPERLIPIDEMIA, UNSPECIFIED HYPERLIPIDEMIA TYPE: ICD-10-CM

## 2024-11-22 PROCEDURE — 1036F TOBACCO NON-USER: CPT | Performed by: INTERNAL MEDICINE

## 2024-11-22 PROCEDURE — 3008F BODY MASS INDEX DOCD: CPT | Performed by: INTERNAL MEDICINE

## 2024-11-22 PROCEDURE — 99214 OFFICE O/P EST MOD 30 MIN: CPT | Performed by: INTERNAL MEDICINE

## 2024-11-22 PROCEDURE — 3074F SYST BP LT 130 MM HG: CPT | Performed by: INTERNAL MEDICINE

## 2024-11-22 PROCEDURE — 77067 SCR MAMMO BI INCL CAD: CPT

## 2024-11-22 PROCEDURE — 3078F DIAST BP <80 MM HG: CPT | Performed by: INTERNAL MEDICINE

## 2024-11-22 ASSESSMENT — PAIN SCALES - GENERAL: PAINLEVEL_OUTOF10: 5

## 2024-11-22 ASSESSMENT — PATIENT HEALTH QUESTIONNAIRE - PHQ9
1. LITTLE INTEREST OR PLEASURE IN DOING THINGS: NOT AT ALL
2. FEELING DOWN, DEPRESSED OR HOPELESS: NOT AT ALL
SUM OF ALL RESPONSES TO PHQ9 QUESTIONS 1 & 2: 0

## 2024-11-22 NOTE — PROGRESS NOTES
"Chief Complaint   Patient presents with    Follow-up     Pt here for 6 mo FUV     64 year old woman  Last evaluation 5/2024.CTS right arm Flared with pulling weeds. Used brace.   Sometimes groin pain at night if walks a lot or her position. Takes tylenol  leg cramps.     Since last visit 8/2024 ER allergic reaction  Saw podiatrist diagnosed with sciatica, superficial peroneal nerve neuropathy saw Dr. Zhong.  She has an appointment in December with pain clinic Dr. Soriano.  She has an appointment for her mammogram today    PAST MEDICAL HISTORY:  1. Hysterectomy for benign reasons.  2. History of ablation for abnormal heart rhythm approximately at age 40.  3. Former smoker.  4. Diverticulitis, recurrent  colon polyps. Colonoscopy, December 2015. Resection part of bowel. Dr Alo KING  5. Chronic back pain.  6. Mitral valve prolapse, mild-mod. MV anterior leaflet prolapse. Last echo 10/2023 EF 60%, mildly elevated RVSP  7. cardiac ablation [facility] 1990s. zio patch 10/2022   8. Left leg, ankle pain. Saw podiatrist. MRI September 2022 lumbar spine. Moderate arthritis     PAST SURGICAL HISTORY:  1. Hysterectomy at age 40.  2. Lumpectomy left breast, benign and bilateral breast augmentation.     SOCIAL HISTORY: Former smoker, quit as a teenager, She works as a   for [employer]. She has no children. She is .    Blood pressure 110/70, pulse 50, temperature 36.2 °C (97.2 °F), height 1.727 m (5' 8\"), weight 78.7 kg (173 lb 9.6 oz), SpO2 98%.  Body mass index is 26.4 kg/m².  Glasses  Vital reviewed  Neck: no cervical/clavicular adenopathy  CV: RRR S1 S2 normal. No murmur. No carotid bruit.   Lungs: CTA without wrr. Breath sounds symmetric      Extremities: no pretibial edema  Neuro: speech intact. No facial asymmetry    Labs November 2024 vitamin D, CMP, lipid, CBC  Vitamin D 35  White blood cell count 5.1 hemoglobin 14.1 platelet 336 creatinine 0.84 glucose 81 liver function test negative  Total cholesterol 166 HDL " 80 LDL 73 triglycerides 65    Labs 4/2024 lipid, cmp, cbc  Cholesterol 173  K 4.2 Cr 0.8   Wbc low 4. Hg 14  glucose 82    Labs 7/2023 cbc, cmp, lipid  Lab Results   Component Value Date    WBC 5.1 11/12/2024    HGB 14.1 11/12/2024    HCT 44.1 11/12/2024    MCV 92 11/12/2024     11/12/2024     Lab Results   Component Value Date    GLUCOSE 81 11/12/2024    CALCIUM 10.0 11/12/2024     11/12/2024    K 4.2 11/12/2024    CO2 31 11/12/2024    CL 98 11/12/2024    BUN 14 11/12/2024    CREATININE 0.84 11/12/2024     Lab Results   Component Value Date    ALT 17 11/12/2024    AST 23 11/12/2024    ALKPHOS 64 11/12/2024    BILITOT 0.6 11/12/2024       1. Hypertension, unspecified type (Primary)  Well controlled, asymptomatic.   - Comprehensive Metabolic Panel; Future  - Lipid Panel; Future    2. Hyperlipidemia, unspecified hyperlipidemia type  - Comprehensive Metabolic Panel; Future  - CBC; Future  - Lipid Panel; Future    Reviewed note from podiatrist.     Mammogram 10/2023- 11/2024  Hysterectomy  Colonoscopy 2015 Dr Russell Stovall (path fragments of tubular adenoma)     Current Outpatient Medications on File Prior to Visit   Medication Sig Dispense Refill    acetaminophen (Tylenol) 500 mg tablet Take 2 tablets (1,000 mg) by mouth. EVERY 4 TO 6 HOURS AS NEEDED.      BACILLUS COAGULANS-INULIN ORAL Take 1 capsule by mouth once daily.      cartilage/collagen/bor/hyalur (JOINT HEALTH ORAL) Take 1 capsule by mouth every other day.      ferrous sulfate ER (Slow Iron) 140 (45 Fe) MG ER tablet Take 1 tablet (45 mg) by mouth every other day.      glucosam/chond-msm1/C/vita/bor (GLUCOSAMINE-CHOND-MSM COMPLEX ORAL) 1 capsule every other day.      hydroCHLOROthiazide (HYDRODiuril) 25 mg tablet Take 1 tablet (25 mg) by mouth once daily. 90 tablet 3    loratadine (Claritin) 10 mg tablet Take 1 tablet (10 mg) by mouth once daily.      losartan (Cozaar) 25 mg tablet Take 1 tablet (25 mg) by mouth once daily. 90 tablet 3     metoprolol succinate XL (Toprol-XL) 25 mg 24 hr tablet Take 1 tablet (25 mg) by mouth once daily. 90 tablet 3    NON FORMULARY 1 each once daily. Nervive      rosuvastatin (Crestor) 20 mg tablet Take 1 tablet (20 mg) by mouth once daily. 90 tablet 3    [DISCONTINUED] glucosamine-D3-Boswellia serr 1,500-400-100 mg-unit-mg tablet Take 1 tablet by mouth every other day. (Patient not taking: Reported on 11/22/2024)       No current facility-administered medications on file prior to visit.

## 2024-11-27 ENCOUNTER — TELEPHONE (OUTPATIENT)
Dept: PRIMARY CARE | Facility: CLINIC | Age: 64
End: 2024-11-27
Payer: COMMERCIAL

## 2024-11-27 NOTE — TELEPHONE ENCOUNTER
Faxed  medical records requesting colonoscopy report.  Received notification that pt has no colonoscopy on file.  Called office of Dr. Russell Stovall - office closed.

## 2024-11-27 NOTE — TELEPHONE ENCOUNTER
----- Message from Eli Cortez sent at 11/22/2024 11:31 AM EST -----  Either had it with Joel Stovall or Russell Stovall. If no record at  then call Dr Russell Stovall's office for record

## 2024-12-02 ENCOUNTER — HOSPITAL ENCOUNTER (OUTPATIENT)
Dept: RADIOLOGY | Facility: EXTERNAL LOCATION | Age: 64
Discharge: HOME | End: 2024-12-02
Payer: COMMERCIAL

## 2025-03-20 DIAGNOSIS — I10 HYPERTENSION, UNSPECIFIED TYPE: ICD-10-CM

## 2025-03-20 RX ORDER — LOSARTAN POTASSIUM 25 MG/1
25 TABLET ORAL DAILY
Qty: 90 TABLET | Refills: 3 | Status: SHIPPED | OUTPATIENT
Start: 2025-03-20

## 2025-05-16 LAB
ALBUMIN SERPL-MCNC: 4.6 G/DL (ref 3.6–5.1)
ALP SERPL-CCNC: 61 U/L (ref 37–153)
ALT SERPL-CCNC: 12 U/L (ref 6–29)
ANION GAP SERPL CALCULATED.4IONS-SCNC: 10 MMOL/L (CALC) (ref 7–17)
AST SERPL-CCNC: 18 U/L (ref 10–35)
BILIRUB SERPL-MCNC: 0.7 MG/DL (ref 0.2–1.2)
BUN SERPL-MCNC: 14 MG/DL (ref 7–25)
CALCIUM SERPL-MCNC: 9.8 MG/DL (ref 8.6–10.4)
CHLORIDE SERPL-SCNC: 96 MMOL/L (ref 98–110)
CHOLEST SERPL-MCNC: 150 MG/DL
CHOLEST/HDLC SERPL: 2 (CALC)
CO2 SERPL-SCNC: 30 MMOL/L (ref 20–32)
CREAT SERPL-MCNC: 0.8 MG/DL (ref 0.5–1.05)
EGFRCR SERPLBLD CKD-EPI 2021: 82 ML/MIN/1.73M2
ERYTHROCYTE [DISTWIDTH] IN BLOOD BY AUTOMATED COUNT: 14.3 % (ref 11–15)
GLUCOSE SERPL-MCNC: 77 MG/DL (ref 65–99)
HCT VFR BLD AUTO: 43.7 % (ref 35–45)
HDLC SERPL-MCNC: 75 MG/DL
HGB BLD-MCNC: 13.8 G/DL (ref 11.7–15.5)
LDLC SERPL CALC-MCNC: 61 MG/DL (CALC)
MCH RBC QN AUTO: 30 PG (ref 27–33)
MCHC RBC AUTO-ENTMCNC: 31.6 G/DL (ref 32–36)
MCV RBC AUTO: 95 FL (ref 80–100)
NONHDLC SERPL-MCNC: 75 MG/DL (CALC)
PLATELET # BLD AUTO: 276 THOUSAND/UL (ref 140–400)
PMV BLD REES-ECKER: 11.1 FL (ref 7.5–12.5)
POTASSIUM SERPL-SCNC: 4 MMOL/L (ref 3.5–5.3)
PROT SERPL-MCNC: 6.7 G/DL (ref 6.1–8.1)
RBC # BLD AUTO: 4.6 MILLION/UL (ref 3.8–5.1)
SODIUM SERPL-SCNC: 136 MMOL/L (ref 135–146)
TRIGL SERPL-MCNC: 63 MG/DL
WBC # BLD AUTO: 4.4 THOUSAND/UL (ref 3.8–10.8)

## 2025-05-28 ASSESSMENT — PROMIS GLOBAL HEALTH SCALE
RATE_SOCIAL_SATISFACTION: VERY GOOD
EMOTIONAL_PROBLEMS: NEVER
RATE_AVERAGE_FATIGUE: MILD
CARRYOUT_SOCIAL_ACTIVITIES: VERY GOOD
CARRYOUT_PHYSICAL_ACTIVITIES: COMPLETELY
RATE_PHYSICAL_HEALTH: GOOD
RATE_QUALITY_OF_LIFE: VERY GOOD
RATE_MENTAL_HEALTH: EXCELLENT
RATE_AVERAGE_PAIN: 2
RATE_GENERAL_HEALTH: GOOD

## 2025-05-30 ENCOUNTER — APPOINTMENT (OUTPATIENT)
Dept: PRIMARY CARE | Facility: CLINIC | Age: 65
End: 2025-05-30
Payer: COMMERCIAL

## 2025-05-30 VITALS
HEIGHT: 68 IN | HEART RATE: 50 BPM | SYSTOLIC BLOOD PRESSURE: 128 MMHG | DIASTOLIC BLOOD PRESSURE: 77 MMHG | TEMPERATURE: 97.3 F | WEIGHT: 174.4 LBS | BODY MASS INDEX: 26.43 KG/M2

## 2025-05-30 DIAGNOSIS — I10 HYPERTENSION, UNSPECIFIED TYPE: ICD-10-CM

## 2025-05-30 DIAGNOSIS — Z12.31 ENCOUNTER FOR SCREENING MAMMOGRAM FOR BREAST CANCER: Primary | ICD-10-CM

## 2025-05-30 DIAGNOSIS — E78.5 HYPERLIPIDEMIA, UNSPECIFIED HYPERLIPIDEMIA TYPE: ICD-10-CM

## 2025-05-30 PROCEDURE — 3008F BODY MASS INDEX DOCD: CPT | Performed by: INTERNAL MEDICINE

## 2025-05-30 PROCEDURE — 1036F TOBACCO NON-USER: CPT | Performed by: INTERNAL MEDICINE

## 2025-05-30 PROCEDURE — 3078F DIAST BP <80 MM HG: CPT | Performed by: INTERNAL MEDICINE

## 2025-05-30 PROCEDURE — 3074F SYST BP LT 130 MM HG: CPT | Performed by: INTERNAL MEDICINE

## 2025-05-30 PROCEDURE — 99214 OFFICE O/P EST MOD 30 MIN: CPT | Performed by: INTERNAL MEDICINE

## 2025-05-30 RX ORDER — METOPROLOL SUCCINATE 25 MG/1
25 TABLET, EXTENDED RELEASE ORAL DAILY
Qty: 90 TABLET | Refills: 3 | Status: SHIPPED | OUTPATIENT
Start: 2025-05-30

## 2025-05-30 RX ORDER — ROSUVASTATIN CALCIUM 20 MG/1
20 TABLET, COATED ORAL DAILY
Qty: 90 TABLET | Refills: 3 | Status: SHIPPED | OUTPATIENT
Start: 2025-05-30

## 2025-05-30 RX ORDER — HYDROCHLOROTHIAZIDE 25 MG/1
25 TABLET ORAL DAILY
Qty: 90 TABLET | Refills: 3 | Status: SHIPPED | OUTPATIENT
Start: 2025-05-30

## 2025-05-30 RX ORDER — GABAPENTIN 100 MG/1
100 CAPSULE ORAL NIGHTLY
COMMUNITY
Start: 2025-04-08

## 2025-05-30 ASSESSMENT — PATIENT HEALTH QUESTIONNAIRE - PHQ9
SUM OF ALL RESPONSES TO PHQ9 QUESTIONS 1 AND 2: 0
1. LITTLE INTEREST OR PLEASURE IN DOING THINGS: NOT AT ALL
2. FEELING DOWN, DEPRESSED OR HOPELESS: NOT AT ALL

## 2025-05-30 NOTE — PROGRESS NOTES
"Chief Complaint   Patient presents with    Follow-up     Declines AVW.    Results     Lab review       64 y.o.  follow up.   Last visit 2024. Will wait until 65 for welcome to Medicare  Recalls childhood vaccine-had reaction-almost . Does not recall which vaccine-she will check if her brother recalls.   Denies LE edema.     Few months ago right breast pain and pruritus of nipple but resolved. Completely asymptomatic now.     PAST MEDICAL HISTORY:  1. Hysterectomy for benign reasons.  2. History of ablation for abnormal heart rhythm approximately at age 40.  3. Former smoker.  4. Diverticulitis, recurrent  colon polyps. Colonoscopy, 2015. Resection part of bowel. Dr Alo KING  5. Chronic back pain.  6. Mitral valve prolapse, mild-mod. MV anterior leaflet prolapse. Last echo 10/2023 EF 60%, mildly elevated RVSP  7. cardiac ablation [facility] . zio patch 10/2022   8. Left leg, ankle pain. Saw podiatrist. MRI 2022 lumbar spine. Moderate arthritis  9. HTN    PAST SURGICAL HISTORY:  1. Hysterectomy at age 40.  2. Lumpectomy left breast, benign and bilateral breast augmentation.     SOCIAL HISTORY: Former smoker, quit as a teenager, She works as a   for custom. She has no children. She is .    Blood pressure 128/77, pulse 50, temperature 36.3 °C (97.3 °F), height 1.727 m (5' 8\"), weight 79.1 kg (174 lb 6.4 oz).  Body mass index is 26.52 kg/m².    Physical Examination. Glasses.   General: NAD. Alert.   HEENT: Normocephalic atraumatic.    Eyes: no scleral icterus. Extraocular movements intact.  Pupils equal round and reactive to light.  Neck:  Supple. No thyroid goiter.  Lymph nodes: No cervical or clavicular adenopathy  Cardiovascular: Regular rate rhythm S1-S2 normal no murmur. No carotid bruit.   Lungs: Clear to Auscultation without wheezing, rales, or rhonchi, Breath sounds symmetric. No use of accessory muscles  Abdomen:  Normoactive bowel sounds, soft, non-tender. No mass.  " "No organomegaly  Extremities: No pretibial edema  Neuro: no facial asymmetry.    No results found for: \"HGBA1C\"  Lab Results   Component Value Date    GLUCOSE 77 05/15/2025    CALCIUM 9.8 05/15/2025     05/15/2025    K 4.0 05/15/2025    CO2 30 05/15/2025    CL 96 (L) 05/15/2025    BUN 14 05/15/2025    CREATININE 0.80 05/15/2025     Lab Results   Component Value Date    ALT 12 05/15/2025    AST 18 05/15/2025    ALKPHOS 61 05/15/2025    BILITOT 0.7 05/15/2025     Lab Results   Component Value Date    WBC 4.4 05/15/2025    HGB 13.8 05/15/2025    HCT 43.7 05/15/2025    MCV 95.0 05/15/2025     05/15/2025     Lab Results   Component Value Date    CHOL 150 05/15/2025    CHOL 166 11/12/2024    CHOL 161 07/31/2023     Lab Results   Component Value Date    HDL 75 05/15/2025    HDL 80.5 11/12/2024    HDL 87.5 07/31/2023     Lab Results   Component Value Date    LDLCALC 61 05/15/2025    LDLCALC 73 11/12/2024     Lab Results   Component Value Date    TRIG 63 05/15/2025    TRIG 65 11/12/2024    TRIG 50 07/31/2023     No components found for: \"CHOLHDL\"      ASSESSMENT/PLAN  Mammogram:  ordered for November.   Hysterectomy  Dexa: start age 65  Colon cancer screening 45 and older: review at [facility]  Immunization: reviewed recommend MMR and pneumonia    1. Encounter for screening mammogram for breast cancer (Primary)  - BI mammo bilateral screening tomosynthesis; Future    2. Hypertension, unspecified type  - hydroCHLOROthiazide (HYDRODiuril) 25 mg tablet; Take 1 tablet (25 mg) by mouth once daily.  Dispense: 90 tablet; Refill: 3  - metoprolol succinate XL (Toprol-XL) 25 mg 24 hr tablet; Take 1 tablet (25 mg) by mouth once daily.  Dispense: 90 tablet; Refill: 3    3. Hyperlipidemia, unspecified hyperlipidemia type  - rosuvastatin (Crestor) 20 mg tablet; Take 1 tablet (20 mg) by mouth once daily.  Dispense: 90 tablet; Refill: 3            Medications Ordered Prior to Encounter[1]         [1]   Current Outpatient Medications " on File Prior to Visit   Medication Sig Dispense Refill    acetaminophen (Tylenol) 500 mg tablet Take 2 tablets (1,000 mg) by mouth. EVERY 4 TO 6 HOURS AS NEEDED.      BACILLUS COAGULANS-INULIN ORAL Take 1 capsule by mouth once daily.      cartilage/collagen/bor/hyalur (JOINT HEALTH ORAL) Take 1 capsule by mouth every other day.      ferrous sulfate ER (Slow Iron) 140 (45 Fe) MG ER tablet Take 1 tablet (45 mg) by mouth every other day.      gabapentin (Neurontin) 100 mg capsule Take 1 capsule (100 mg) by mouth once daily at bedtime.      glucosam/chond-msm1/C/vita/bor (GLUCOSAMINE-CHOND-MSM COMPLEX ORAL) 1 capsule every other day.      hydroCHLOROthiazide (HYDRODiuril) 25 mg tablet Take 1 tablet (25 mg) by mouth once daily. 90 tablet 3    loratadine (Claritin) 10 mg tablet Take 1 tablet (10 mg) by mouth once daily.      losartan (Cozaar) 25 mg tablet TAKE ONE TABLET BY MOUTH ONCE DAILY 90 tablet 3    metoprolol succinate XL (Toprol-XL) 25 mg 24 hr tablet Take 1 tablet (25 mg) by mouth once daily. 90 tablet 3    NON FORMULARY 1 each once daily. Nervive      rosuvastatin (Crestor) 20 mg tablet Take 1 tablet (20 mg) by mouth once daily. 90 tablet 3     No current facility-administered medications on file prior to visit.

## 2025-08-18 ENCOUNTER — TELEPHONE (OUTPATIENT)
Dept: PRIMARY CARE | Facility: CLINIC | Age: 65
End: 2025-08-18

## 2025-08-22 ENCOUNTER — APPOINTMENT (OUTPATIENT)
Dept: PRIMARY CARE | Facility: CLINIC | Age: 65
End: 2025-08-22
Payer: COMMERCIAL

## 2025-08-29 ENCOUNTER — APPOINTMENT (OUTPATIENT)
Dept: PRIMARY CARE | Facility: CLINIC | Age: 65
End: 2025-08-29
Payer: COMMERCIAL

## 2025-08-29 VITALS
OXYGEN SATURATION: 94 % | TEMPERATURE: 97.7 F | DIASTOLIC BLOOD PRESSURE: 68 MMHG | HEIGHT: 68 IN | BODY MASS INDEX: 27.01 KG/M2 | HEART RATE: 59 BPM | WEIGHT: 178.2 LBS | SYSTOLIC BLOOD PRESSURE: 132 MMHG

## 2025-08-29 DIAGNOSIS — Z00.00 ROUTINE GENERAL MEDICAL EXAMINATION AT HEALTH CARE FACILITY: Primary | ICD-10-CM

## 2025-08-29 DIAGNOSIS — R20.2 SENSATION OF SKIN CRAWLING: ICD-10-CM

## 2025-08-29 DIAGNOSIS — E55.9 VITAMIN D DEFICIENCY: ICD-10-CM

## 2025-08-29 DIAGNOSIS — Z12.31 ENCOUNTER FOR SCREENING MAMMOGRAM FOR BREAST CANCER: ICD-10-CM

## 2025-08-29 DIAGNOSIS — I10 HYPERTENSION, UNSPECIFIED TYPE: ICD-10-CM

## 2025-08-29 DIAGNOSIS — E28.319 ASYMPTOMATIC PREMATURE MENOPAUSE: ICD-10-CM

## 2025-08-29 DIAGNOSIS — Z00.00 WELCOME TO MEDICARE PREVENTIVE VISIT: ICD-10-CM

## 2025-08-29 DIAGNOSIS — E78.5 HYPERLIPIDEMIA, UNSPECIFIED HYPERLIPIDEMIA TYPE: ICD-10-CM

## 2025-08-29 PROCEDURE — 1036F TOBACCO NON-USER: CPT | Performed by: INTERNAL MEDICINE

## 2025-08-29 PROCEDURE — 1158F ADVNC CARE PLAN TLK DOCD: CPT | Performed by: INTERNAL MEDICINE

## 2025-08-29 PROCEDURE — 1170F FXNL STATUS ASSESSED: CPT | Performed by: INTERNAL MEDICINE

## 2025-08-29 PROCEDURE — G0402 INITIAL PREVENTIVE EXAM: HCPCS | Performed by: INTERNAL MEDICINE

## 2025-08-29 PROCEDURE — 1160F RVW MEDS BY RX/DR IN RCRD: CPT | Performed by: INTERNAL MEDICINE

## 2025-08-29 PROCEDURE — 1126F AMNT PAIN NOTED NONE PRSNT: CPT | Performed by: INTERNAL MEDICINE

## 2025-08-29 PROCEDURE — 1123F ACP DISCUSS/DSCN MKR DOCD: CPT | Performed by: INTERNAL MEDICINE

## 2025-08-29 PROCEDURE — 3078F DIAST BP <80 MM HG: CPT | Performed by: INTERNAL MEDICINE

## 2025-08-29 PROCEDURE — 1159F MED LIST DOCD IN RCRD: CPT | Performed by: INTERNAL MEDICINE

## 2025-08-29 PROCEDURE — 3075F SYST BP GE 130 - 139MM HG: CPT | Performed by: INTERNAL MEDICINE

## 2025-08-29 PROCEDURE — 3008F BODY MASS INDEX DOCD: CPT | Performed by: INTERNAL MEDICINE

## 2025-08-29 ASSESSMENT — PAIN SCALES - GENERAL: PAINLEVEL_OUTOF10: 0-NO PAIN

## 2025-08-29 ASSESSMENT — PATIENT HEALTH QUESTIONNAIRE - PHQ9
1. LITTLE INTEREST OR PLEASURE IN DOING THINGS: NOT AT ALL
2. FEELING DOWN, DEPRESSED OR HOPELESS: NOT AT ALL
SUM OF ALL RESPONSES TO PHQ9 QUESTIONS 1 AND 2: 0

## 2025-08-29 ASSESSMENT — ACTIVITIES OF DAILY LIVING (ADL)
DOING_HOUSEWORK: INDEPENDENT
MANAGING_FINANCES: INDEPENDENT
GROCERY_SHOPPING: INDEPENDENT
TAKING_MEDICATION: INDEPENDENT
DRESSING: INDEPENDENT
BATHING: INDEPENDENT

## 2025-09-04 ENCOUNTER — TELEPHONE (OUTPATIENT)
Dept: PRIMARY CARE | Facility: CLINIC | Age: 65
End: 2025-09-04
Payer: MEDICARE

## 2025-11-03 ENCOUNTER — APPOINTMENT (OUTPATIENT)
Dept: RADIOLOGY | Facility: HOSPITAL | Age: 65
End: 2025-11-03
Payer: COMMERCIAL

## 2025-11-24 ENCOUNTER — APPOINTMENT (OUTPATIENT)
Dept: RADIOLOGY | Facility: CLINIC | Age: 65
End: 2025-11-24
Payer: COMMERCIAL

## 2026-02-27 ENCOUNTER — APPOINTMENT (OUTPATIENT)
Dept: PRIMARY CARE | Facility: CLINIC | Age: 66
End: 2026-02-27
Payer: MEDICARE